# Patient Record
Sex: MALE | Race: WHITE | Employment: FULL TIME | URBAN - METROPOLITAN AREA
[De-identification: names, ages, dates, MRNs, and addresses within clinical notes are randomized per-mention and may not be internally consistent; named-entity substitution may affect disease eponyms.]

---

## 2022-02-22 ENCOUNTER — TELEPHONE (OUTPATIENT)
Dept: PULMONOLOGY | Age: 22
End: 2022-02-22

## 2022-02-22 NOTE — TELEPHONE ENCOUNTER
NPT referral from [facility] for GRACIELA. Called Christel with [phone] and L/M asking for return call to schedule appt.

## 2022-04-14 ENCOUNTER — OFFICE VISIT (OUTPATIENT)
Dept: SLEEP MEDICINE | Age: 22
End: 2022-04-14
Payer: COMMERCIAL

## 2022-04-14 VITALS
SYSTOLIC BLOOD PRESSURE: 110 MMHG | HEART RATE: 69 BPM | TEMPERATURE: 98.5 F | DIASTOLIC BLOOD PRESSURE: 57 MMHG | HEIGHT: 71 IN | OXYGEN SATURATION: 98 % | WEIGHT: 194 LBS | RESPIRATION RATE: 17 BRPM | BODY MASS INDEX: 27.16 KG/M2

## 2022-04-14 DIAGNOSIS — G47.30 OBSERVED SLEEP APNEA: ICD-10-CM

## 2022-04-14 DIAGNOSIS — R06.83 SNORING: Primary | ICD-10-CM

## 2022-04-14 DIAGNOSIS — R53.83 OTHER FATIGUE: ICD-10-CM

## 2022-04-14 DIAGNOSIS — F51.04 PSYCHOPHYSIOLOGICAL INSOMNIA: ICD-10-CM

## 2022-04-14 PROCEDURE — 99204 OFFICE O/P NEW MOD 45 MIN: CPT | Performed by: NURSE PRACTITIONER

## 2022-04-14 ASSESSMENT — SLEEP AND FATIGUE QUESTIONNAIRES
HOW LIKELY ARE YOU TO NOD OFF OR FALL ASLEEP WHILE SITTING AND READING: 1
HOW LIKELY ARE YOU TO NOD OFF OR FALL ASLEEP WHEN YOU ARE A PASSENGER IN A CAR FOR AN HOUR WITHOUT A BREAK: 0
HOW LIKELY ARE YOU TO NOD OFF OR FALL ASLEEP WHILE SITTING AND TALKING TO SOMEONE: 0
HOW LIKELY ARE YOU TO NOD OFF OR FALL ASLEEP WHILE WATCHING TV: 1
HOW LIKELY ARE YOU TO NOD OFF OR FALL ASLEEP WHILE LYING DOWN TO REST IN THE AFTERNOON WHEN CIRCUMSTANCES PERMIT: 2
HOW LIKELY ARE YOU TO NOD OFF OR FALL ASLEEP IN A CAR, WHILE STOPPED FOR A FEW MINUTES IN TRAFFIC: 0
HOW LIKELY ARE YOU TO NOD OFF OR FALL ASLEEP WHILE SITTING QUIETLY AFTER LUNCH WITHOUT ALCOHOL: 1
ESS TOTAL SCORE: 6
HOW LIKELY ARE YOU TO NOD OFF OR FALL ASLEEP WHILE SITTING INACTIVE IN A PUBLIC PLACE: 1
NECK CIRCUMFERENCE (INCHES): 15

## 2022-04-14 NOTE — PATIENT INSTRUCTIONS
Sleep Hygiene. .. Tips for better sleep. .. Avoid naps. This will ensure you are sleepy at bedtime. If you have to take a nap, sleep less than 1 hour, before 3 pm.  Sleep only when sleepy; this reduces the time you are awake in bed. Regular exercise is recommended to help you deepen your sleep, but not within 4-6 hours of your bedtime. Timing of exercise is important, aim to exercise early in the morning or early afternoon. A light snack may help you fall asleep. Warm milk and foods high in the amino acid tryptophan, such as bananas, may help you to sleep  Be sure to avoid heavy, spicy or sugary foods 4-6 hours before bedtime and avoid at snack time. Stay away from stimulants such as caffeine and nicotine for at least 4-6 hours before bed. Stimulants can interfere with your ability to fall asleep. Caffeine is found in tea, cola, coffee, cocoa and chocolate and is best avoided at bedtime. Nicotine is found in tobacco products. Avoid alcohol 4-6 hours before bedtime. Alcohol has an immediate sleep-inducing effect, after a few hours when alcohol levels fall there is a stimulant or wake-up effect and will cause fragmented sleep. Sleep rituals are important. Give your body clues it is time to slow down and sleep. Examples include; yoga, deep breathing, listen to relaxing music, a hot bath or a few minutes of reading. Have a fixed bedtime and awakening time, Even on weekends! You will feel better keeping a regular sleep cycle, even if you are retired or not working. Get into your favorite sleep position. If not asleep in 30 minutes, get up and do something boring until you feel sleepy. Remember not to expose yourself to bright lights such as TV, phone or tablet screens. Only use your bed for sleeping. Do not use your bed as an office, workroom or recreation room. Use comfortable bedding. Uncomfortable bedding can prevent good sleep. Ensure your bedroom is quiet and comfortable.  A cooler room along with enough blankets to stay warm is recommended. If your room is too noisy, try a white noise machine. If too bright, try black out shades or an eye mask. Dont take worries to bed. Leave worries about work, school etc. behind you when you go to bed. Some people find it helpful to assign a worry period in the evening or late afternoon to write down your worries and get them out of your system. The Sleep Center at [address] [place] [address], [address] [place] [address]                      Phone: [phone] Fax: [phone]      Please arrive at the [facility] at the time indicated. On Saturday and Sunday night a sleep tech will come down to let you in the building and escort you upstairs to the sleep center; please call from the parking lot if no one is at the door when you arrive. PLEASE DO NOT ARRIVE TO THE SLEEP CENTER ANY EARLIER THAN 8:30PM AS THERE IS NO STAFF ON DUTY AND THE DOORS WILL BE LOCKED    IMPORTANT: We ask that you please phone the [address] [place] [address] Patient Pre-Services ([phone]) at least 3-5 days prior to your sleep study to pre-register. Failing to pre-register may ultimately cause your insurance to not pay for this procedure. Please be aware that [address] [place] [address] is a non-smoking facility. There is no smoking allowed anywhere on [facility] at any time. Each patient room is a private room with cable television, WiFi and a private bathroom with shower facilities. The test itself consists of electrodes and sensors attached to specific areas of your scalp, face, chest and legs. We will also monitor respiratory effort, nasal and oral airflow and oxygen levels. The test will not hurt; it is completely painless and not invasive in any way. Please bring with you:  ? Appropriate nightclothes (pajamas, sweats, etc.), slippers and robe  ?  All medications you need during your stay, including breathing medications, nebulizers and metered dose inhalers, as well as a complete list of all medications you are currently taking. ? Your own toiletries and hairdryer if you wish to shower before you leave  ? Current Photo I.D. and insurance card  ? We do not allow any pillows or bed linens from home due to health regulations  ? We recommend that you do not bring valuables with you to the Sleep Center as we cannot be responsible for any lost or damaged personal items. Please refrain from or reduce the use of caffeine and/or alcohol prior to your sleep study and avoid napping the day of your study as these will affect the accuracy of your test results. If you are ill the day of your test (cold, upper respiratory infection, flu, etc.) please call to reschedule your test as the test will not be accurate if you are ill. If you should need to cancel or reschedule your appointment, please call the Sleep Center at [phone] as soon as possible. Please also call the Sleep Center directly to let us know if you have any special needs, dietary needs or for any further questions.

## 2022-04-14 NOTE — PROGRESS NOTES
Patient ID: Zofia King is a 20 y.o. male who is being seen today for   Chief Complaint   Patient presents with    New Patient     [facility]     [facility]    HPI:   Zofia King is a 20 y.o. male in office for GRACIELA. Patient reports snoring at night for the past 5+ years. Unsure if worse in supine position. Wakes self snoring. Has witnessed apnea. Sometimes wakes up at night choking and gasping for air. No restorative sleep. +dry mouth upon awakening. Fatigue and tiredness during the day. No history of sleep apnea. Bedtime 10 pm and rise time is 545-615 am. It takes unknown minutes to fall asleep- lays there. 0-2 nocturia. Wakes up 2-4 times at night. It takes few- unknown minutes to fall back a sleep. Takes rare nap during the day. Rare headache in am. No car wrecks or near wrecks because of the sleepiness. No nodding off while driving. No weight gain. No forgetfulness,  +decreased concentration. Drinks 0-1 caffinated beverages per day. No significant alcohol. Sometimes restless feelings in legs at night. No teeth grinding. Occasional nightmares. No sleep walking. No night time panic attacks. No narcotics. No drug abuse. No history of depression. +history of anxiety, has seen provider in the past. No history of atrial fibrillation. No history of DM. No history of HTN. No history of ischemic heart disease. No history of stroke. ESS is 6. No smoking, does vape, does not know when he will quit. No FH for known RLS or narcolepsy.  +FH for GRACIELA- [facility]     Sleep Medicine 4/14/2022   Sitting and reading 1   Watching TV 1   Sitting, inactive in a public place (e.g. a theatre or a meeting) 1   As a passenger in a car for an hour without a break 0   Lying down to rest in the afternoon when circumstances permit 2   Sitting and talking to someone 0   Sitting quietly after a lunch without alcohol 1   In a car, while stopped for a few minutes in traffic 0   Total score 6   Neck circumference (Inches) 15       Past Medical History:  History reviewed. No pertinent past medical history. Past Surgical History:        Procedure Laterality Date    CLAVICLE SURGERY Right     2019,2020       Allergies:  has No Known Allergies. Social History:    TOBACCO:   reports that he has quit smoking. His smoking use included cigarettes. He has a 3.00 pack-year smoking history. He has quit using smokeless tobacco.  ETOH:   reports previous alcohol use. Family History:       Problem Relation Age of Onset    Cancer Paternal Grandmother         lung    Hypertension Paternal Grandmother     Heart Failure Neg Hx     Emphysema Neg Hx     Diabetes Neg Hx        Current Medications:  No current outpatient medications on file. Review of Systems  Constitutional: Negative for fever  HENT: Negative for sore throat  Eyes: Negative for redness   Respiratory: Negative for dyspnea, cough  Cardiovascular: Negative for chest pain  Gastrointestinal: Negative for vomiting, diarrhea   Genitourinary: Negative for hematuria   Musculoskeletal: Negative forarthralgias   Skin: Negative for rash  Neurological: Negative for syncope  Hematological: Negative for adenopathy  Psychiatric/Behavorial: Negative for anxiety      Objective:   PHYSICAL EXAM:  BP (!) 110/57   Pulse 69   Temp 98.5 °F (36.9 °C)   Resp 17   Ht 5' 11\" (1.803 m)   Wt 194 lb (88 kg)   SpO2 98% Comment: RA  BMI 27.06 kg/m²     Physical Exam  Gen: No acute distress. Obese. BMI of 27.06  Eyes: PERRL. No sclera icterus. No conjunctival injection. ENT: No discharge. Pharynx clear. Mallampati class III. Neck: Trachea midline. No obvious mass. Neck circumference 15\"  Resp: No accessory muscle use. Nocrackles. No wheezes. No rhonchi. CV: Regular rate. Regular rhythm. No murmur or rub. Skin: Warm and dry. M/S: No cyanosis. No obvious joint deformity. Neuro: Awake. Alert. Moves all four extremities. Psych: Oriented x 3. No anxiety.        DATA:       Assessment: · Snoring  · Observed sleep apnea   · Fatigue  · Sleep onset and maintenance insomniapsychophysiological hyperarousal, possible GRACIELA likely contributing        Plan:      · PSG to evaluate forsleep related breathing disorder. · Treatment options were discussed with patient if PSG reveals GRACIELA, including CPAP therapy, oral appliances and upper airway surgery. · Sleep hygiene  · Cognitive behavioral therapy was discussed with patient including stimulus control and sleep restriction   · Avoidsedatives, alcohol and caffeinated drinks at bed time. · No driving motorized vehicles or operating heavy machinery while fatigue, drowsy or sleepy. · Weight loss is also recommended as a long-term intervention. · Complications of GRACIELA if not treated were discussed with patient patient to include systemic hypertension, pulmonary hypertension, cardiovascular morbidities, car accidents and all cause mortality. Discussed pathophysiology of GRACIELA with patient today.   Patient education handout provided regarding sleep tips

## 2022-05-16 ENCOUNTER — HOSPITAL ENCOUNTER (OUTPATIENT)
Dept: SLEEP CENTER | Age: 22
Discharge: HOME | End: 2022-05-18
Payer: COMMERCIAL

## 2022-05-16 DIAGNOSIS — R53.83 OTHER FATIGUE: ICD-10-CM

## 2022-05-16 DIAGNOSIS — F51.04 PSYCHOPHYSIOLOGICAL INSOMNIA: ICD-10-CM

## 2022-05-16 DIAGNOSIS — G47.30 OBSERVED SLEEP APNEA: ICD-10-CM

## 2022-05-16 DIAGNOSIS — R06.83 SNORING: ICD-10-CM

## 2022-05-16 PROCEDURE — 95810 POLYSOM 6/> YRS 4/> PARAM: CPT

## 2022-05-24 ENCOUNTER — TELEMEDICINE (OUTPATIENT)
Dept: SLEEP MEDICINE | Age: 22
End: 2022-05-24
Payer: COMMERCIAL

## 2022-05-24 ENCOUNTER — TELEPHONE (OUTPATIENT)
Dept: PULMONOLOGY | Age: 22
End: 2022-05-24

## 2022-05-24 DIAGNOSIS — R06.83 SNORING: ICD-10-CM

## 2022-05-24 DIAGNOSIS — F51.04 PSYCHOPHYSIOLOGICAL INSOMNIA: Primary | ICD-10-CM

## 2022-05-24 DIAGNOSIS — R53.83 OTHER FATIGUE: ICD-10-CM

## 2022-05-24 PROCEDURE — 99213 OFFICE O/P EST LOW 20 MIN: CPT | Performed by: NURSE PRACTITIONER

## 2022-05-24 ASSESSMENT — SLEEP AND FATIGUE QUESTIONNAIRES
HOW LIKELY ARE YOU TO NOD OFF OR FALL ASLEEP WHILE WATCHING TV: 1
HOW LIKELY ARE YOU TO NOD OFF OR FALL ASLEEP WHILE SITTING AND READING: 2
HOW LIKELY ARE YOU TO NOD OFF OR FALL ASLEEP WHILE LYING DOWN TO REST IN THE AFTERNOON WHEN CIRCUMSTANCES PERMIT: 2
HOW LIKELY ARE YOU TO NOD OFF OR FALL ASLEEP IN A CAR, WHILE STOPPED FOR A FEW MINUTES IN TRAFFIC: 0
HOW LIKELY ARE YOU TO NOD OFF OR FALL ASLEEP WHILE SITTING QUIETLY AFTER LUNCH WITHOUT ALCOHOL: 2
HOW LIKELY ARE YOU TO NOD OFF OR FALL ASLEEP WHILE SITTING AND TALKING TO SOMEONE: 0
HOW LIKELY ARE YOU TO NOD OFF OR FALL ASLEEP WHILE SITTING INACTIVE IN A PUBLIC PLACE: 0
ESS TOTAL SCORE: 8
HOW LIKELY ARE YOU TO NOD OFF OR FALL ASLEEP WHEN YOU ARE A PASSENGER IN A CAR FOR AN HOUR WITHOUT A BREAK: 1

## 2022-05-24 NOTE — TELEPHONE ENCOUNTER
Pt to f/u PRN. Need to fax visit note and sleep studies to [place], but study may need edited first.  Violetta Diaz will let us know.

## 2022-05-24 NOTE — PROGRESS NOTES
Patient ID: Aashish Lee is a 20 y.o. male who is being seen today for   Chief Complaint   Patient presents with    Results     sleep study 5/19/22     [facility]    HPI:     Aashish Lee is a 20 y.o. male for televideo appointment via video and audio doxy. me virtual visit for PSG results. PSG was reviewed by me and noted below. It showed no GRACIELA. Results were dicussed with patient and multiple good questions were answered     No significant daytime sleepiness. Denies nodding off when driving. +fatigue. Bedtime is 930 pm- MN and rise time is 545-6 am. Sleep onset is few-120 minutes- lays there, off phone 45 min prior to sleep. States more physical activity the quicker he falls asleep. States he has tried trazodone 150 mg in the past however he did not like that it made him feel tired in the mornings. Wakes up 2-6 times at night total. 0-1 nocturia. It takes few-10 minutes to fall back a sleep. Does state he has some anxiety. Rare naps during the day. No headache in am. No caffienated beverages during the day. Rare alcohol. ESS is 8        Initial HPI 4/14/22  Aashish Lee is a 20 y.o. male in office for GRACIELA. Patient reports snoring at night for the past 5+ years. Unsure if worse in supine position. Wakes self snoring. Has witnessed apnea. Sometimes wakes up at night choking and gasping for air. No restorative sleep. +dry mouth upon awakening. Fatigue and tiredness during the day. No history of sleep apnea. Bedtime 10 pm and rise time is 545-615 am. It takes unknown minutes to fall asleep- lays there. 0-2 nocturia. Wakes up 2-4 times at night. It takes few- unknown minutes to fall back a sleep. Takes rare nap during the day. Rare headache in am. No car wrecks or near wrecks because of the sleepiness. No nodding off while driving. No weight gain. No forgetfulness,  +decreased concentration. Drinks 0-1 caffinated beverages per day. No significant alcohol. Sometimes restless feelings in legs at night.   No teeth grinding. Occasional nightmares. No sleep walking. No night time panic attacks. No narcotics. No drug abuse. No history of depression. +history of anxiety, has seen provider in the past. No history of atrial fibrillation. No history of DM. No history of HTN. No history of ischemic heart disease. No history of stroke. ESS is 6. No smoking, does vape, does not know when he will quit. No FH for known RLS or narcolepsy. +FH for GRACIELA- mom     Sleep Medicine 5/24/2022 4/14/2022   Sitting and reading 2 1   Watching TV 1 1   Sitting, inactive in a public place (e.g. a theatre or a meeting) 0 1   As a passenger in a car for an hour without a break 1 0   Lying down to rest in the afternoon when circumstances permit 2 2   Sitting and talking to someone 0 0   Sitting quietly after a lunch without alcohol 2 1   In a car, while stopped for a few minutes in traffic 0 0   Total score 8 6   Neck circumference (Inches) - 15       Past Medical History:  History reviewed. No pertinent past medical history. Past Surgical History:        Procedure Laterality Date    CLAVICLE SURGERY Right     2019,2020       Allergies:  has No Known Allergies. Social History:    TOBACCO:   reports that he has quit smoking. His smoking use included cigarettes. He has a 3.00 pack-year smoking history. He has quit using smokeless tobacco.  ETOH:   reports previous alcohol use. Family History:       Problem Relation Age of Onset    Cancer Paternal Grandmother         lung    Hypertension Paternal Grandmother     Heart Failure Neg Hx     Emphysema Neg Hx     Diabetes Neg Hx        Current Medications:  No current outpatient medications on file.         Review of Systems  Constitutional: Negative for fever  HENT: Negative for sore throat  Eyes: Negative for redness   Respiratory: Negative for dyspnea, cough  Cardiovascular: Negative for chest pain  Gastrointestinal: Negative for vomiting, diarrhea   Genitourinary: Negative for hematuria Musculoskeletal: Negative forarthralgias   Skin: Negative for rash  Neurological: Negative for syncope  Hematological: Negative for adenopathy  Psychiatric/Behavorial: Negative for anxiety      Objective:   PHYSICAL EXAM:  There were no vitals taken for this visit. Physical Exam  Exam:  Gen: No acute distress, does not appear to be in pain. Appears well developed and nourished. HENT: Head is normocephalic and atraumatic. Normal appearing nose. External Ears normal.   Neck: No visualized mass. Trachea is midline   Eyes: EOM intact. No visible discharge. Resp:No visualized signs of difficulty breathing or respiratory distress, speaking in full sentences. Respiratory effort normal.  Neuro: Awake. Alert. Able to follow commands. No facial asymmetry. Skin: No significant lesions or discoloration noted on facial skin    Musculoskeletal: Normal range of motion of the neck. Psych: Oriented x 3. No anxiety. Normal affect. DATA:   5/16/2022 PSG AHI 0.6, low SPO2 92%, no GRACIELA    Assessment:       · Snoring  · Observed sleep apnea   · Fatigue  · Sleep onset and maintenance insomnia-psychophysiological hyperarousal, comorbid conditions likely contributing        Plan:      · Discussed PSG results. No GRACIELA shown, no significant PLMS. · Recommend discuss with PCP other possible causes for fatigue and tiredness. · Address anxiety/depression with PCP if present as this could be contributing to poor sleep and fatigue. · Increase activity in the day as patient states this helps with his sleep at night  · Could consider repeat PSG if symptoms worsen  · Sleep hygiene  · Cognitive behavioral therapy was discussed with patient including stimulus control and sleep restriction   · Avoid sedatives, alcohol and caffeinated drinks at bed time. · No driving motorized vehicles or operating heavy machinery while fatigue, drowsy or sleepy-patient verbalized understanding and agrees.    · Weight loss is also recommended as a long-term intervention. · Complications of GRACIELA if not treated were discussed with patient patient to include systemic hypertension, pulmonary hypertension, cardiovascular morbidities, car accidents and all cause mortality. Discussed pathophysiology of GRACIELA with patient today. Patient education handout provided regarding sleep tips    Dorothy Aldana, was evaluated through a synchronous (real-time) audio-video encounter. The patient (or guardian if applicable) is aware that this is a billable service, which includes applicable co-pays. This Virtual Visit was conducted with patient's (and/or legal guardian's) consent. The visit was conducted pursuant to the emergency declaration under the [address] [place] [address], [address] [place] [address] authority and the [employer] Act. Patient identification was verified, and a caregiver was present when appropriate. The patient was located at home in a state where the provider was licensed to provide care. Total time spent for this encounter: Not billed by time    --GUERO Verduzco CNP on 5/24/2022 at 11:40 AM    An electronic signature was used to authenticate this note.

## 2022-05-24 NOTE — TELEPHONE ENCOUNTER
Within this Telehealth Consent, the terms you and yours refer to the person using the Telehealth Service (Service), or in the case of a use of the Service by or on behalf of a minor, you and yours refer to and include (i) the parent or legal guardian who provides consent to the use of the Service by such minor or uses the Service on behalf of such minor, and (ii) the minor for whom consent is being provided or on whose behalf the Service is being utilized. When using Service, you will be consulting with your health care providers via the use of Telehealth.   Telehealth involves the delivery of healthcare services using electronic communications, information technology or other means between a healthcare provider and a patient who are not in the same physical location. Telehealth may be used for diagnosis, treatment, follow-up and/or patient education, and may include, but is not limited to, one or more of the following:    Electronic transmission of medical records, photo images, personal health information or other data between a patient and a healthcare provider    Interactions between a patient and healthcare provider via audio, video and/or data communications    Use of output data from medical devices, sound and video files    Anticipated Benefits   The use of Telehealth by your Provider(s) through the Service may have the following possible benefits:    Making it easier and more efficient for you to access medical care and treatment for the conditions treated by such Provider(s) utilizing the Service    Allowing you to obtain medical care and treatment by Provider(s) at times that are convenient for you    Enabling you to interact with Provider(s) without the necessity of an in-office appointment     Possible Risks   While the use of Telehealth can provide potential benefits for you, there are also potential risks associated with the use of Telehealth.  These risks include, but may not be limited to the following:    Your Provider(s) may not able to provide medical treatment for your particular condition and you may be required to seek alternative healthcare or emergency care services.  The electronic systems or other security protocols or safeguards used in the Service could fail, causing a breach of privacy of your medical or other information.  Given regulatory requirements in certain jurisdictions, your Provider(s) diagnosis and/or treatment options, especially pertaining to certain prescriptions, may be limited. Acceptance   1. You understand that Services will be provided via Telehealth. This process involves the use of HIPAA compliant and secure, real-time audio-visual interfacing with a qualified and appropriately trained provider located at [facility]. 2. You understand that, under no circumstances, will this session be recorded. 3. You understand that the Provider(s) at [facility] and other clinical participants will be party to the information obtained during the Telehealth session in accordance with best medical practices. 4. You understand that the information obtained during the Telehealth session will be used to help determine the most appropriate treatment options. 5. You understand that You have the right to revoke this consent at any point in time. 6. You understand that Telehealth is voluntary, and that continued treatment is not dependent upon consent. 7. You understand that, in the event of non-consent to Telehealth services and/or technical difficulties, you will obtain services as typically provided in the absence of Telehealth technology. 8. You understand that this consent will be kept in Your medical record. 9. No potential benefits from the use of Telehealth or specific results can be guaranteed. Your condition may not be cured or improved and, in some cases, may get worse.    10. There are limitations in the provision of medical care and treatment via Telehealth and the Service and you may not be able to receive diagnosis and/or treatment through the Service for every condition for which you seek diagnosis and/or treatment. 11. There are potential risks to the use of Telehealth, including but not limited to the risks described in this Telehealth Consent. 12. Your Provider(s) have discussed the use of Telehealth and the Service with you, including the benefits and risks of such and you have provided oral consent to your Provider(s) for the use of Telehealth and the Service. 15. You understand that it is your duty to provide your Provider(s) truthful, accurate and complete information, including all relevant information regarding care that you may have received or may be receiving from other healthcare providers outside of the Service. 14. You understand that each of your Provider(s) may determine in his or sole discretion that your condition is not suitable for diagnosis and/or treatment using the Service, and that you may need to seek medical care and treatment a specialist or other healthcare provider, outside of the Service. 15. You understand that you are fully responsible for payment for all services provided by Provider(s) or through use of the Service and that you may not be able to use third-party insurance. 16. You represent that (a) you have read this Telehealth Consent carefully, (b) you understand the risks and benefits of the Service and the use of Telehealth in the medical care and treatment provided to you by Provider(s) using the Service, and (c) you have the legal capacity and authority to provide this consent for yourself and/or the minor for which you are consenting under applicable federal and state laws, including laws relating to the age of [de-identified] and/or parental/guardian consent.    17. You give your informed consent to the use of Telehealth by Provider(s) using the Service under the terms described in the Terms of Service and this Telehealth Consent. The patient was read the following statement and has consented to the visit as of 5/24/22. The patient has been scheduled for their first telehealth visit on 5/24/22 with Binta Mckeon CNP.